# Patient Record
(demographics unavailable — no encounter records)

---

## 2025-07-24 NOTE — HISTORY OF PRESENT ILLNESS
[FreeTextEntry1] : Sagar is n 89 years old gentleman here with his wife, he has seen Dr. Valdovinos before for years for LUTS mostly urgency and frequency has used Flomax in the past but off that now and not using any medications for the prostate and had no previous interventions or prostate biopsies. He is not doing further PSA testing due to his age.  Denies UTI, or Hematuria, no Hx of urinary retention/catherization. No abdominal or Pelvic Surgery. He sees neurology for memory loss for 2 years. He has CAD and a pacemaker and sees cardiology. Regular  Bowel movement. He is former smoker.  - No FH of PCa - IPSS:   9 QOL 3     UA negative today.  PVR 20ml.

## 2025-07-24 NOTE — ASSESSMENT
[FreeTextEntry1] : 1- LUTS stable no medications 2- ED uses sildenafil 20mg prn refills sent, he was warned about side effects and doesn't use nitrates or sees [facility] 3- PCa screening not recommended at his age based on a shared decision making according to AUA guidelines.  RTC 1 year